# Patient Record
Sex: FEMALE | Race: WHITE
[De-identification: names, ages, dates, MRNs, and addresses within clinical notes are randomized per-mention and may not be internally consistent; named-entity substitution may affect disease eponyms.]

---

## 2018-09-13 ENCOUNTER — HOSPITAL ENCOUNTER (OUTPATIENT)
Dept: HOSPITAL 80 - FIMAGING | Age: 38
End: 2018-09-13
Attending: OBSTETRICS & GYNECOLOGY
Payer: COMMERCIAL

## 2018-09-13 DIAGNOSIS — O09.513: ICD-10-CM

## 2018-09-13 DIAGNOSIS — Z3A.35: ICD-10-CM

## 2018-09-13 DIAGNOSIS — O09.813: Primary | ICD-10-CM

## 2018-09-25 NOTE — PDGENHP
History and Physical





- Chief Complaint


pre-op for primary C/S





- History of Present Illness


38 G1 here for pre-op for primary C/S at 38w5d secondary to persistent marginal 

placenta previa in setting of breech presentation in a unicornuate uterus.  





Pt has been doing well. Good FM, no VB, no LOF, no ssx PIH.  Has received 

collaborative prenatal care with [facility] and [facility]. 


Pregnancy was by IVF, and has been complicated by mild polyhydramnios  - MATT 

25.8 at 35w5d, hypothyroidism - on 25mcg levothroid daily. 





M recommendation was to schedule delivery between 37 and 39 weeks.  





Prenatal labs:


B pos 


Rubella NON Immune


GBS Neg. 





All other prenatal labs wnl, including 3 hr GTT after having a 151 1 hour GTT. 


Mild anemia at 27 weeks  - 11.9/35.5, up to 12.3/36.2 at 36 wk 


Had neg CCS/PGD (comprehensive chromosome screening with preimplantation 

genetic diagnosis for aneuploidy) 














History Information





- Allergies/Home Medication List


Allergies/Adverse Reactions: 








naproxen Allergy (Intermediate, Verified 09/25/18 11:43)


 Rash





Home Medications: 








Aspirin 81mg (*) 81 mg PO DAILY 09/25/18 [Last Taken 09/25/18 81]


Levothyroxine 25 mcg PO DAILY 09/25/18 [Last Taken Unknown]


Prenatal Dha PO DAILY 09/25/18 [Last Taken Unknown]





I have personally reviewed and updated: family history, medical history, social 

history, surgical history


Past Medical History: Rubella non-Immune.  Left Unicornuate uterus.  

Hypothyroidism.  Asthma





- Surgical History


Additional surgical history: Laparoscopic left salpingectomy for hydrosalpinx 5/ 2017





- Family History


Positive for: diabetes type II (PGM, PAunt)


Additional family history: mother - anxiety, depression.  PGM - Alzheimers





- Social History


Smoking Status: Never smoked


Alcohol Use: None (none during pregnancy)


Drug Use: None


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 

[employer]





Review of Systems


Review of Systems: 





ROS: 10pt was reviewed & negative except for what was stated in HPI & below





Physical Exam


Physical Exam: 








Wt 155lb  /64


Gen - pleasant gravid female, NAD


HEENT - grossly wnl


CV - RRR


chest - CTAB


abd - soft, gravid, NT, fundal height 38cm


ext - BLE without edema, no calf tenderness


FHR - 120, reactive, Cat 1 tracing NST today


toco - no contractions











Skin: warm, normal color


Psychiatric: interacting appropriately





Assessment & Plan


Assessment: 





A/P: 39 yo G1 at 37w1d on 9/24/18 with a marginal placenta previa and breech 

presentation in a unicornuate uterus - will be 38w5d on 10/5/18


Pregnancy c/b mild polyhydramnios (MATT 25.8 at 35w5d with MFM), hypothyroidism, 

IVF pregnancy, hx of mild asthma. 





B/R/A of primary C/S discussed, including anticipated recovery time and 

postoperative care.  


Written informed consent obtained and pt has original form with her.  I have a 

copy. 


Will proceed with primary C/S as scheduled at 0730 on 10/5/18, EDUARDO Blackwell

, scheduled to assist. 





Dian Farr MD, [facility]


[employer] [facility].

## 2018-10-05 ENCOUNTER — HOSPITAL ENCOUNTER (INPATIENT)
Dept: HOSPITAL 80 - FLD | Age: 38
LOS: 3 days | Discharge: HOME | End: 2018-10-08
Attending: OBSTETRICS & GYNECOLOGY | Admitting: OBSTETRICS & GYNECOLOGY
Payer: COMMERCIAL

## 2018-10-05 DIAGNOSIS — O44.23: Primary | ICD-10-CM

## 2018-10-05 DIAGNOSIS — O40.3XX0: ICD-10-CM

## 2018-10-05 DIAGNOSIS — E03.9: ICD-10-CM

## 2018-10-05 DIAGNOSIS — Z3A.38: ICD-10-CM

## 2018-10-05 DIAGNOSIS — D64.9: ICD-10-CM

## 2018-10-05 DIAGNOSIS — O34.03: ICD-10-CM

## 2018-10-05 LAB
INR PPP: 1.15 (ref 0.83–1.16)
PLATELET # BLD: 149 10^3/UL (ref 150–400)
PLATELET # BLD: 149 10^3/UL (ref 150–400)
PLATELET # BLD: 200 10^3/UL (ref 150–400)
PROTHROMBIN TIME: 14.9 SEC (ref 12–15)

## 2018-10-05 PROCEDURE — P9041 ALBUMIN (HUMAN),5%, 50ML: HCPCS

## 2018-10-05 RX ADMIN — KETOROLAC TROMETHAMINE SCH MG: 30 INJECTION, SOLUTION INTRAMUSCULAR at 19:17

## 2018-10-05 RX ADMIN — KETOROLAC TROMETHAMINE SCH MG: 30 INJECTION, SOLUTION INTRAMUSCULAR at 12:52

## 2018-10-05 RX ADMIN — IBUPROFEN SCH: 600 TABLET ORAL at 19:24

## 2018-10-05 RX ADMIN — ACETAMINOPHEN SCH: 325 TABLET ORAL at 20:41

## 2018-10-05 RX ADMIN — ACETAMINOPHEN SCH: 325 TABLET ORAL at 15:53

## 2018-10-05 RX ADMIN — ACETAMINOPHEN SCH: 325 TABLET ORAL at 18:18

## 2018-10-05 RX ADMIN — IBUPROFEN SCH: 600 TABLET ORAL at 15:53

## 2018-10-05 NOTE — POSTANESTH
Post Anesthetic Evaluation


Cardiovascular Status: Tx Hyper/Hypo-tension


Respiratory Status: Normal, Stable, Similar to Pre-op Cond.


Level of Consciousness/Mental Status: Can Participate in Eval, Alert and 

Oriented


Pain Control: Adequate, Prn Tx Ordered


Nausea/Vomiting Control: Adequate, Prn Tx Ordered


Complications Possibly Related to Anesthesia: Other, See Comments (Treating 

hypotension due to blood loss during C Section. Getting phenylephrine prn and 

500 ml of 5% albumin.)

## 2018-10-05 NOTE — OBDEL
Birth Info


Birth Type: Primary 


Presentation at Delivery: Breech


L&D Analgesia/Anesthesia Type: Spinal


GBS+: No


Intrapartum Medications: 





 











Generic Name Dose Route Start Last Admin





  Trade Name Freq  PRN Reason Stop Dose Admin


 


Acetaminophen  650 mg  10/05/18 09:15  10/05/18 20:41





  Tylenol  PO  19 09:14  Not Given





  Q6H WERNER   


 


Ibuprofen  600 mg  10/05/18 09:15  10/05/18 19:24





  Motrin  PO  19 09:14  Not Given





  Q6H WERNER   


 


Ketorolac Tromethamine  30 mg  10/05/18 09:15  10/05/18 19:17





  Toradol  IVP  10/06/18 03:16  30 mg





  Q6H WERNER   Administration














Discontinued Medications














Generic Name Dose Route Start Last Admin





  Trade Name Freq  PRN Reason Stop Dose Admin


 


Citric Acid/Sodium Citrate  30 ml  10/05/18 05:49  10/05/18 14:14





  Bicitra  PO  10/05/18 05:50  Not Given





  ONCALL ONE   


 


Cefazolin Sodium/Dextrose  100 mls @ 200 mls/hr  10/05/18 05:49  10/05/18 07:38





  Ancef 2 Gm  IV  10/05/18 06:18  100 mls





  ONCALL ONE   Administration





  Protocol   


 


Lactated Ringer's  500 mls @ 0 mls/hr  10/05/18 05:49  10/05/18 07:00





  Lr  IV  10/05/18 05:50  500 mls





  ONCE ONE   Administration





  As Directed   


 


Tranexamic Acid 1,000 mg/  110 mls @ 660 mls/hr  10/05/18 11:32  10/05/18 12:00





  Sodium Chloride  IV  10/05/18 11:41  110 mls





  ONCE ONE   Administration














- Infant Care Provider


Pediatrician/NNP: Vicki Wagner





 Operative Report





- Delivery


Pre-op Diagnoses: breech presentation.  marginal placenta previa.  uterine 

anomaly


Post-op Diagnoses: same


History of Prior  Section: No


Nulliparous Prior to Delivery: Yes


Indications for Current  Section: Breech (uterine didelphys - 2 

separate horns of uterus noted), Placenta Previa


Procedure: Scheduled


Surgeon: Dian Farr


Assistant: Kayla Roberts


Anesthesiologist: Fran Valencia


Complications: Nucal Cord


Findings: 





see op note


Specimen(s)/Path: Placenta


IV Fluid (ml): 3,000


EBL: 1200





[place] Birth Data


PETRA: [birth year]


Gestational Age: 38 week(s) and 5 day(s)


  ** Rodrigues


Delivery Date: 10/05/18


Delivery Time: 08:15


Sex of Infant: Female


[place] Weight (gm): 2874 g


Apgar Score (1 Min): 8


Apgar Score (5 Min): 9





ICD10 Worksheet


Patient Problems: 


 Problems











Problem Status Onset


 


Breech presentation Acute  


 


 delivery delivered Acute  


 


 delivery delivered Acute  


 


Didelphic uterus Acute  


 


Didelphic uterus Acute  


 


Marginal placenta previa Acute  














- ICD10 Problem Qualifiers


(1) Didelphic uterus








(2) Didelphic uterus








(3) Marginal placenta previa








(4) Breech presentation


Qualifiers: 


   Fetus number: single or unspecified fetus   Qualified Code(s): O32.1XX0 - 

Maternal care for breech presentation, not applicable or unspecified   





(5)  delivery delivered








(6)  delivery delivered

## 2018-10-05 NOTE — PREANESOB
Obstetric Pre-Anesthesia Info





- General Info


Proposed Procedure: C Section.


: 1


Para: 0


PETRA: [birth year]


Gestational Age: 38 week(s) and 5 day(s)





- Fetal Info


Fetal Status: Full Term


Fetal Monitors: External


FHR Baseline (bpm): 150


FHR Pattern: Reassuring





- Labor Status


 Section History: Primary


Indications for Current  Section: Breech, Placenta Previa


Labor Epidural: No





Anesthesia


ROS: 


General anesthesia for laparoscopy.


Allergies/Adverse Reactions: 


 











Allergy/AdvReac Type Severity Reaction Status Date / Time


 


naproxen Allergy Intermediate Rash Verified 18 11:43











Home Medications: 














 Medication  Instructions  Recorded


 


Aspirin 81mg (*) 81 mg PO DAILY 18


 


Prenatal Dha PO DAILY 18


 


Iron 1 tab PO DAILY 10/05/18


 


Omega-3 1 tab PO DAILY 10/05/18











Visit Medications: 





 











Generic Name Dose Route Start Last Admin





  Trade Name Freq  PRN Reason Stop Dose Admin


 


Acetaminophen  650 mg  10/05/18 09:15  





  Tylenol  PO  19 09:14  





  Q6H [facility]   


 


Diphenhydramine HCl  25 - 50 mg  10/05/18 10:41  





  Benadryl Injection  IVP  10/06/18 10:40  





  Q6HRS PRN   





  Itching   


 


Docusate Sodium  100 mg  10/05/18 09:08  





  Colace  PO  19 09:07  





  BID PRN   





  Constipation   


 


Lactated Ringer's  1,000 mls @ 125 mls/hr  10/05/18 05:49  





  Lr  IV  10/06/18 05:48  





  CONT WERNER   


 


Oxytocin/Lactated Ringer's  500 mls @ 125 mls/hr  10/05/18 09:30  





  Pitocin 30 Units/Lr (Premix)  IV  10/06/18 09:29  





  CONT WERNER   


 


Ibuprofen  600 mg  10/05/18 09:15  





  Motrin  PO  19 09:14  





  Q6H WERNER   


 


Ketorolac Tromethamine  30 mg  10/05/18 09:15  





  Toradol  IVP  10/06/18 03:16  





  Q6H [facility]   


 


Meperidine HCl  12.5 - 25 mg  10/05/18 10:41  





  Demerol  IVP  10/05/18 11:41  





  Q10M PRN   





  shivering/rigors   


 


Naloxone HCl  0.4 mg  10/05/18 10:41  





  Narcan  IVP  10/06/18 10:42  





  PRN PRN   





  respiratory depression   


 


Ondansetron HCl  4 mg  10/05/18 10:41  





  Zofran  IVP  10/06/18 10:40  





  Q4HRS PRN   





  Nausea/Vomiting, Can't Take PO   


 


Oxycodone HCl  5 mg  10/05/18 09:08  





  Oxycodone Ir  PO  10/15/18 09:07  





  Q4HRS PRN   





  Pain, Severe   


 


Phenylephrine HCl  100 mcg  10/05/18 10:41  





  Neosynephrine  IVP  10/05/18 11:41  





  Q1M PRN   





  Hypotension   


 


Promethazine HCl  25 mg  10/05/18 09:08  





  Phenergan  IVP  19 09:07  





  Q6HRS PRN   





  Nausea/Vomiting, Use 1st   


 


Simethicone  80 mg  10/05/18 09:08  





  Mylicon  PO  19 09:07  





  .TIDMEALS AND HS PRN   





  Gas   














Discontinued Medications














Generic Name Dose Route Start Last Admin





  Trade Name Freq  PRN Reason Stop Dose Admin


 


Albumin Human  Confirm  10/05/18 09:53  





  Alburx 5  Administered  10/05/18 09:54  





  Dose   





  500 ml   





  IV   





  .STK-MED ONE   


 


Bupivacaine HCl/Dextrose  Confirm  10/05/18 09:07  





  Marcaine Spinal  Administered  10/05/18 09:08  





  Dose   





  2 ml   





  SP   





  .STK-MED ONE   


 


Citric Acid/Sodium Citrate  30 ml  10/05/18 05:49  





  Bicitra  PO  10/05/18 05:50  





  ONCALL ONE   


 


Dexamethasone  Confirm  10/05/18 08:47  





  Decadron Injection  Administered  10/05/18 08:48  





  Dose   





  4 mg   





  .ROUTE   





  .STK-MED ONE   


 


Dexamethasone  Confirm  10/05/18 08:47  





  Decadron Injection  Administered  10/05/18 08:48  





  Dose   





  4 mg   





  .ROUTE   





  .STK-MED ONE   


 


Fentanyl  Confirm  10/05/18 07:23  





  Sublimaze  Administered  10/05/18 07:24  





  Dose   





  100 mcg   





  .ROUTE   





  .STK-MED ONE   


 


Cefazolin Sodium/Dextrose  100 mls @ 200 mls/hr  10/05/18 05:49  10/05/18 07:38





  Ancef 2 Gm  IV  10/05/18 06:18  100 mls





  ONCALL ONE   Administration





  Protocol   


 


Lactated Ringer's  500 mls @ 0 mls/hr  10/05/18 05:49  





  Lr  IV  10/05/18 05:50  





  ONCE ONE   





  As Directed   


 


Meperidine HCl  Confirm  10/05/18 09:29  





  Demerol  Administered  10/05/18 09:30  





  Dose   





  25 mg   





  .ROUTE   





  .STK-MED ONE   


 


Methylergonovine Maleate  Confirm  10/05/18 08:22  





  Methergine  Administered  10/05/18 08:23  





  Dose   





  0.2 mg   





  .ROUTE   





  .STK-MED ONE   


 


Misoprostol  Confirm  10/05/18 08:20  





  Cytotec  Administered  10/05/18 08:21  





  Dose   





  800 mcg   





  .ROUTE   





  .STK-MED ONE   


 


Morphine Sulfate  Confirm  10/05/18 07:22  





  Morphine Pf 5 Mg/10 Ml  Administered  10/05/18 07:23  





  Dose   





  5 mg   





  .ROUTE   





  .STK-MED ONE   


 


Ondansetron HCl  Confirm  10/05/18 08:47  





  Zofran  Administered  10/05/18 08:48  





  Dose   





  4 mg   





  .ROUTE   





  .STK-MED ONE   


 


Ondansetron HCl  Confirm  10/05/18 08:47  





  Zofran  Administered  10/05/18 08:48  





  Dose   





  4 mg   





  .ROUTE   





  .STK-MED ONE   


 


Oxytocin  Confirm  10/05/18 08:48  





  Pitocin  Administered  10/05/18 08:49  





  Dose   





  100 units   





  .ROUTE   





  .STK-MED ONE   


 


Phenylephrine HCl  Confirm  10/05/18 08:46  





  Neosynephrine  Administered  10/05/18 08:47  





  Dose   





  1,000 mcg   





  .ROUTE   





  .STK-MED ONE   


 


Phenylephrine HCl  Confirm  10/05/18 08:46  





  Neosynephrine  Administered  10/05/18 08:47  





  Dose   





  1,000 mcg   





  .ROUTE   





  .STK-MED ONE   


 


Phenylephrine HCl  Confirm  10/05/18 08:46  





  Neosynephrine  Administered  10/05/18 08:47  





  Dose   





  1,000 mcg   





  .ROUTE   





  .STK-MED ONE   


 


Phenylephrine HCl  Confirm  10/05/18 09:07  





  Neosynephrine  Administered  10/05/18 09:08  





  Dose   





  1,000 mcg   





  .ROUTE   





  .STK-MED ONE   














- Anesthesia History


Response to Local Anesthetics: Normal


Anesthesia & Operative History: No Prior Problems


Family Anesthesia History: Negative





- Social History


Substance Use/Abuse: Denies





- Vital Signs


Latest Vital Signs (Nursing): 





 











Temp Pulse Resp BP Pulse Ox


 


 36.3 C   105 H  15   60/40 L  98 


 


 10/05/18 06:18  10/05/18 06:18  10/05/18 09:42  10/05/18 09:42  10/05/18 09:42











Blood Pressure: 126/78


Heart Rate: 79


Height/Weight (Nursing): 





 











Height                         162.56 cm


 


Weight                         70.307 kg

















- Focused Exam


Neck exam: FROM


Mallampati Score: Class 1


Mouth exam: normal dental/mouth exam


Pulmonary: no respiratory distress


Cardiovascular: regular rate and rhythym


Labs: 





 





 10/05/18 10:15 





 











Patient ABO/Rh  B POSITIVE   10/05/18  05:45    














- Plan


Anesthetic Plan: SAB


Consent Signed and on Chart: Yes


Patient/Guardian Understands and Agrees to Plan: Yes


Urgent/Emergent Case: Clair healy completed preop but documented later for safe 

timely pt care

## 2018-10-05 NOTE — GOP
DATE OF OPERATION:  



SURGEON:  Dian Farr MD



ASSISTANT:  EDUARDO Mg



ANESTHESIOLOGIST:  Fran Valencia MD



PREOPERATIVE DIAGNOSIS:  

1.  A 38-week 5-day gestation intrauterine pregnancy.

2.  Breech presentation.

3.  Marginal placenta previa.

4.  Uterine anomaly.



POSTOPERATIVE DIAGNOSIS:  

1.  A 38-week 5-day gestation intrauterine pregnancy.

2.  Breech presentation.

3.  Marginal placenta previa.

4.  Uterine anomaly.

5 . Suspected placenta accreta



PROCEDURE PERFORMED:  Primary low transverse  section.



FINDINGS:  Delivery of a female infant from the breech presentation with Apgars 
of 8 and 9.  Delivery was at 8:15.  Placenta time was at 8:23.  The pregnancy 
was in the left uterus.  There was a normal-appearing ovary on the left side.  
The tube was absent consistent with a previous laparoscopy.  The left uterine 
tissue seemed more thin and less contractile than average, and contributed to 
atony.  My recommendation would be against considering a vaginal birth after 
this  section - due to the abnormal uterine anatomy and the suspected 
accreta.  Risk for abnormal placentation, including placenta percreta and 
increta, would likely be higher in a future pregnancy.  The right uterus was 
very small, approximately 3 cm with a normal-appearing tube connected and a 
normal-appearing right ovary.  After the initial postpartum period, a full 
vaginal exam needs to be completed to evaluate for a second cervix and a 
vaginal septum. 



ESTIMATED BLOOD LOSS:  1200 mL.



INDICATIONS:  A 38-year-old  1 female at 38 weeks 5 days' gestation with 
breech presentation, marginal placenta previa, and a suspected unicornuate 
uterus.  Maternal Fetal Medicine recommendations included delivery between 37 
and 39 weeks.  Written informed consent was reviewed with the patient in the 
preoperative area.



DESCRIPTION OF PROCEDURE:  The patient was taken to the operating room where 
spinal anesthetic was placed.  She was then placed in the dorsal supine position
, and a Santos catheter was placed.  Her abdomen was sterilely prepared and 
draped in the standard fashion.  2 g of Ancef had been given IV prior to coming 
into the operating room.  A time-out was performed.  An incision was made and 
taken down sharply to the fascia.  The fascia was incised in the midline.  This 
was extended laterally sharply on both sides.  The upper aspect of the incision 
was grasped with Kocher clamps and dissected away sharply and bluntly from the 
underlying rectus muscles.  This was repeated on the inferior aspect of the 
incision.  The peritoneum was entered bluntly after  the rectus 
muscles.   A bladder blade and Rich were inserted.  Assessment was made of the 
uterine cavity confirming the fetal vertex to be in the upper left maternal 
quadrant.  The hysterotomy was made and taken down sharply.  The placenta was 
noted to be anterior.  This was entered bluntly, and the uterine incision was 
extended sharply laterally with bandage scissors.  The fetal feet were then 
able to be grasped and delivered through the incision.  A wet towel was used to 
support the fetal body.  The fetus was delivered to the thorax.  Then, the 
anterior shoulder, which was the right shoulder, was rotated up.  The arm was 
able to be swept across the fetal chest and easily delivered.  This was 
repeated on the left side.  Fetal vertex was then able to be flexed and easily 
delivered.  Delayed cord clamping for 1 minute was performed.  During that time
, the fetus was stimulated, bulb suctioned, and dried.  The cord was then 
clamped, and the infant was handed to the waiting  nurse practitioner, 
who was Jesi Wagner.  A cord segment was obtained, which was later discarded.  
Cord blood was then obtained.  The uterus was then exteriorized after there was 
difficulty delivering the placenta.  Significant effort was made in order to 
remove the placenta from the uterine cavity.  It was quite adherent, and there 
was difficulty in obtaining planes between the placenta and the uterus in 
certain areas.  When the placenta was fully delivered. The uterus was cleared 
of all clots and debris. Atony was noted.  The hysterotomy was then repaired 
with 0 Monocryl in a running locked fashion.  Prior to fully closing the 
hysterotomy, 800mcg of misoprostil was placed in the endometrial cavity.  A 
second layer was performed in imbricating fashion.  With the addition of cautery
, excellent hemostasis was noted.  The uterus was replaced into the abdomen.  
The gutters were cleared of all clots and debris.  The fascia was closed with 0 
PDS in a running fashion.  Sukhi fascia was closed with 3-0 Monocryl in a 
running fashion.  The skin was closed with 4-0 Monocryl in a running 
subcuticular fashion.  Mastisol was applied and then Steri-Strips and a 
dressing were applied.  Fundal massage was then performed to remove clots from 
the lower uterine segment.  The patient was transferred to a bed and taken to 
the recovery room in stable condition.  Sponge, lap, and needle counts were 
correct x2.



ANESTHESIA ASSISTANT STUDENT:  Rolf Wilks



INTRAVENOUS FLUIDS:  3 L.



URINE OUTPUT:  600 mL.





Job #:  [id number]

MTDD

## 2018-10-05 NOTE — SOAPPROG
SOAP Progress Note


Assessment/Plan: 


Assessment:38 G1 now P1, POD#0 s/p primary C/S 2/2 breech, marginal previa, 

uterine anomaly - had an adherent placenta intra-op and some additional uterine 

atony in the recovery room.  


Appropriate drop in H/H, but due to hypotension and tachycardia, 1gm of 

Tranexamic Acid was given approximately 3 hours after delivery, and bleeding 

resolved and vital signs stablized. 








Plan:  Transfer to Mom/Baby


Discussed with Marcie Bourne CNM, who will be rounding on patient tomorrow. 


Will recheck CBC in AM. 


Also reviewed rec against a  for pt, and pt agreed to have placenta 

examined for signs of accreta. 


Dian Farr MD, FACOG





10/05/18 13:25





Subjective: 


Pt was quite fatigued 1-2 hours after delivery, but about 4 hours after delivery

, was feeling much brighter, and breastfeeding has gone well so far. Pain well 

controlled. 


Objective: 





 Vital Signs











Temp Pulse Resp BP Pulse Ox


 


 36.3 C   79   12   124/75 H  100 


 


 10/05/18 06:18  10/05/18 10:56  10/05/18 12:20  10/05/18 12:18  10/05/18 12:20








 Laboratory Results





 10/05/18 10:15 





 











 10/04/18 10/05/18 10/06/18





 05:59 05:59 05:59


 


Intake Total   3500


 


Output Total   1950


 


Balance   1550








 











PT  14.9 SEC (12.0-15.0)   10/05/18  10:15    


 


INR  1.15  (0.83-1.16)   10/05/18  10:15    








gen - pleasant, NAD


about 2 hours ago - had about 200ml clot with fundal massage, while just now - 

barely a trickle was noted. 





- Time Spent With Patient


Time Spent With Patient: 





10 min








- Pending Discharge


Pending Discharge Within 24 Hours: No


Pending Discharge Within 48 Hours: No





Physical Exam





- Physical Exam


EENT: PERRL/EOMI


Neck: non-tender





ICD10 Worksheet


Patient Problems: 


 Problems











Problem Status Onset


 


Breech presentation Acute  


 


 delivery delivered Acute  


 


 delivery delivered Acute  


 


Didelphic uterus Acute  


 


Didelphic uterus Acute  


 


Marginal placenta previa Acute  














- ICD10 Problem Qualifiers


(1) Didelphic uterus








(2) Didelphic uterus








(3) Marginal placenta previa








(4) Breech presentation


Qualifiers: 


   Fetus number: single or unspecified fetus   Qualified Code(s): O32.1XX0 - 

Maternal care for breech presentation, not applicable or unspecified   





(5)  delivery delivered








(6)  delivery delivered

## 2018-10-05 NOTE — PDHPUP
History & Physical Update


H&P update statement: 


This history and physical update is based on an assessment of the patient which 

was completed after admission or registration (within 24 hours), but prior to 

the surgery/procedure.


No changes since H&P done in the office. 


H&P update: H&P reviewed & patient examined, no change in patient's condition 

since H&P completed

## 2018-10-05 NOTE — POSTOPPROG
Post Op Note


Date of Operation: 10/05/18


Surgeon: Dian Farr


Assistant: EDUARDO Blackwell


Anesthesiologist: Walter Valencia MD  and SHERRY Engel student


Anesthesia: Spinal


Pre-op Diagnosis: breech presentation, marginal placenta previa


Post-op Diagnosis: same, uterine didelphys, suspect placenta accreta


Indication: breech presentation, marginal placenta previa


Procedure: primary low transverse  section


Findings: pregnancy in left uterus, normal appearing ovaries, very small right 

uterus


Inf/Abcess present in the surg proc area at time of surgery?: No


EBL: Greater than 1000


Total fluids administered: 3000


Complications: 





uterine atony


800 mcg misoprostil given intrauterine


Specimen(s): 





Placenta to pathology to evaluate for accreta, if pt allows

## 2018-10-06 LAB — PLATELET # BLD: 123 10^3/UL (ref 150–400)

## 2018-10-06 RX ADMIN — ACETAMINOPHEN SCH: 325 TABLET ORAL at 03:22

## 2018-10-06 RX ADMIN — ACETAMINOPHEN SCH MG: 325 TABLET ORAL at 15:00

## 2018-10-06 RX ADMIN — IBUPROFEN SCH: 600 TABLET ORAL at 03:22

## 2018-10-06 RX ADMIN — KETOROLAC TROMETHAMINE SCH: 30 INJECTION, SOLUTION INTRAMUSCULAR at 19:01

## 2018-10-06 RX ADMIN — IBUPROFEN SCH MG: 600 TABLET ORAL at 20:57

## 2018-10-06 RX ADMIN — IBUPROFEN SCH MG: 600 TABLET ORAL at 14:55

## 2018-10-06 RX ADMIN — ACETAMINOPHEN SCH: 325 TABLET ORAL at 16:23

## 2018-10-06 RX ADMIN — IBUPROFEN SCH: 600 TABLET ORAL at 16:25

## 2018-10-06 RX ADMIN — KETOROLAC TROMETHAMINE SCH MG: 30 INJECTION, SOLUTION INTRAMUSCULAR at 01:13

## 2018-10-06 RX ADMIN — ACETAMINOPHEN SCH MG: 325 TABLET ORAL at 20:57

## 2018-10-06 NOTE — OBPP
PostPartum Progress Note


Assessment/Plan: 


Assessment:





[id number]


s/p primary c/s


post op anemia


POD#1


breastfeeding








Plan:


Routine PO care


lactation support PRN


shower/ambulate


garcia d/c - encourage voiding


start iron BID





10/06/18 09:17





10/06/18 09:21





Subjective/Postpartum Course: 





10/06/18 09:18


Pt doing well, she denies any pain or heavy bleeding. She states she did not 

get much sleep last night. Baby has latched, but will cont to work with 

lactation today. She denies any lightheadedness or faintness. 


Objective: 





 





 10/06/18 05:45 





 











Patient ABO/Rh  B POSITIVE   10/05/18  05:45    








 











Temp Pulse Resp BP Pulse Ox


 


 36.8 C   75   16   99/63 L  95 


 


 10/06/18 05:24  10/06/18 05:24  10/06/18 05:24  10/06/18 05:24  10/06/18 05:24











Uterine Position/Fundal Height: Umbilicus -1, Displaced to Left


Uterine Tone: Firm





PostPartum Physical Exam





- Physical Exam


General Appearance: WD/WN, alert, no apparent distress


Abdomen: non-tender, soft, dressing (C/D/I)


Skin: normal color, warm/dry


Neuro/Psych: alert, normal mood/affect, oriented x 3

## 2018-10-07 RX ADMIN — IBUPROFEN SCH MG: 600 TABLET ORAL at 22:19

## 2018-10-07 RX ADMIN — IRON SUPPLEMENT SCH MG: 325 TABLET ORAL at 22:19

## 2018-10-07 RX ADMIN — IBUPROFEN SCH MG: 600 TABLET ORAL at 10:17

## 2018-10-07 RX ADMIN — ACETAMINOPHEN SCH MG: 325 TABLET ORAL at 10:16

## 2018-10-07 RX ADMIN — IBUPROFEN SCH MG: 600 TABLET ORAL at 03:08

## 2018-10-07 RX ADMIN — IRON SUPPLEMENT SCH MG: 325 TABLET ORAL at 10:17

## 2018-10-07 RX ADMIN — ACETAMINOPHEN SCH MG: 325 TABLET ORAL at 03:08

## 2018-10-07 RX ADMIN — DOCUSATE SODIUM AND SENNOSIDES SCH TAB: 50; 8.6 TABLET ORAL at 10:15

## 2018-10-07 RX ADMIN — ACETAMINOPHEN SCH MG: 325 TABLET ORAL at 16:35

## 2018-10-07 RX ADMIN — ACETAMINOPHEN SCH MG: 325 TABLET ORAL at 22:19

## 2018-10-07 RX ADMIN — DOCUSATE SODIUM AND SENNOSIDES SCH TAB: 50; 8.6 TABLET ORAL at 22:20

## 2018-10-07 RX ADMIN — IBUPROFEN SCH MG: 600 TABLET ORAL at 16:35

## 2018-10-07 NOTE — OBPP
PostPartum Progress Note


Assessment/Plan: 


Assessment:38 G1 now P1, POD#0 s/p primary C/S 2/2 breech, marginal previa, 

uterine anomaly - had an adherent placenta intra-op and some additional uterine 

atony in the recovery room.  


Appropriate drop in H/H, but due to hypotension and tachycardia, 1gm of 

Tranexamic Acid was given approximately 3 hours after delivery, and bleeding 

resolved and vital signs stablized. 








Plan:  Transfer to Mom/Baby


Discussed with Marcie Bourne CNM, who will be rounding on patient tomorrow. 


Will recheck CBC in AM. 


Also reviewed rec against a  for pt, and pt agreed to have placenta 

examined for signs of accreta. 


Dian Farr MD, FACOG





10/05/18 13:25





A/P: POD#2 s/p primary C/S 2/2 breech, marginal previa, uterine anomaly with an 

adherent placenta intraop and pp uterine atony, doing well and symptomatic 

anemia. 


Doing well, continue routine post op cares, lactation support, iron 

supplementation.


Discussed significant anemia - if becomes symptomatic, especially after 

homegoing - needs to let us know.  If lochia increases significantly, needs to 

let us know.  Reviewed importance of hydration. 


Anticipate dc home tomorrow. 


Dian Farr MD, FACOG


[facility]





10/07/18 11:43








Subjective/Postpartum Course: 





10/06/18 09:18


Pt doing well, she denies any pain or heavy bleeding. She states she did not 

get much sleep last night. Baby has latched, but will cont to work with 

lactation today. She denies any lightheadedness or faintness. 


10/07/18 11:47


Doing well today.  Pain well controlled with po acetominophen, ibuprofen, and 

then oxy IR for breakthrough.  Breastfeeding going better today.  


Is ambulating without any lightheadedness or dizziness.  Denies any dyspnea or 

chest pain.  


Objective: 





 





 10/06/18 05:45 





 











Patient ABO/Rh  B POSITIVE   10/05/18  05:45    








 











Temp Pulse Resp BP Pulse Ox


 


 36.3 C   85   16   125/76 H  97 


 


 10/07/18 10:00  10/07/18 10:00  10/07/18 10:00  10/07/18 10:00  10/07/18 10:00








gen - pleasant female, NAD, currently breastfeeding infant. 


CV - RRR


chest - CTAB


abd - soft, + NABS, fundus firm at u-2


inc - C/D/I with steristrips in place


ext - trace BLE edema, neg jose's no calf tenderness


Uterine Position/Fundal Height: Umbilicus -2


Uterine Tone: Firm

## 2018-10-08 VITALS — SYSTOLIC BLOOD PRESSURE: 119 MMHG | DIASTOLIC BLOOD PRESSURE: 73 MMHG

## 2018-10-08 RX ADMIN — ACETAMINOPHEN SCH MG: 325 TABLET ORAL at 03:56

## 2018-10-08 RX ADMIN — IBUPROFEN SCH MG: 600 TABLET ORAL at 03:55

## 2018-10-08 RX ADMIN — IBUPROFEN SCH MG: 600 TABLET ORAL at 10:09

## 2018-10-08 RX ADMIN — ACETAMINOPHEN SCH MG: 325 TABLET ORAL at 10:09

## 2018-10-08 RX ADMIN — IRON SUPPLEMENT SCH MG: 325 TABLET ORAL at 08:24

## 2018-10-08 RX ADMIN — DOCUSATE SODIUM AND SENNOSIDES SCH TAB: 50; 8.6 TABLET ORAL at 08:24

## 2018-10-08 NOTE — OBPP
PostPartum Progress Note


Assessment/Plan: 


Assessment:


























Plan:





10/08/18 12:05


Day 3 post csection


Asymptomatic Anemia


Establishing breastfeeding


10/08/18 12:06








Plan:  Discharge home today.  Continue scheduled ibuprofen and tylenol for the 

first week, then wean down.  Oxycodone as needed.  Iron bid and continue 

prenatal vitamin.  Will recheck at 6 weeks. Reviewed reasons to page with 

complications.  Will follow up with us at 2, 4 and 6 weeks.


Subjective/Postpartum Course: 





10/06/18 09:18


Pt doing well, she denies any pain or heavy bleeding. She states she did not 

get much sleep last night. Baby has latched, but will cont to work with 

lactation today. She denies any lightheadedness or faintness. 


10/07/18 11:47


Doing well today.  Pain well controlled with po acetominophen, ibuprofen, and 

then oxy IR for breakthrough.  Breastfeeding going better today.  


Is ambulating without any lightheadedness or dizziness.  Denies any dyspnea or 

chest pain.  


10/08/18 12:04


Feeling very good today.  Denies dizziness, no difficulties ambulating, 

bleeding minimal, pain well controlled with scheduled ibuprofen and tylenol. 

Has not yet taken Oxycodone but would like a rx for home in case she does need 

it.  Has not yet had a bowel movement but is passing gas.  No issues with 

voiding. Baby nursing well.  Nipples sore but intact.


Objective: 





 





 10/06/18 05:45 





 











Patient ABO/Rh  B POSITIVE   10/05/18  05:45    








 











Temp Pulse Resp BP Pulse Ox


 


 36.6 C   88   17   115/78   94 


 


 10/07/18 20:00  10/07/18 20:00  10/07/18 20:00  10/07/18 20:00  10/07/18 20:00








Incision site well approximated, no erythema, discharge or redness


Breasts:  nipples intact bilaterally, breasts soft


Uterine Position/Fundal Height: Umbilicus -1


Uterine Tone: Firm